# Patient Record
Sex: MALE | Race: WHITE | Employment: FULL TIME | URBAN - METROPOLITAN AREA
[De-identification: names, ages, dates, MRNs, and addresses within clinical notes are randomized per-mention and may not be internally consistent; named-entity substitution may affect disease eponyms.]

---

## 2017-02-15 RX ORDER — FLUTICASONE FUROATE AND VILANTEROL TRIFENATATE 100; 25 UG/1; UG/1
POWDER RESPIRATORY (INHALATION)
Qty: 1 EACH | Refills: 0 | Status: SHIPPED | OUTPATIENT
Start: 2017-02-15 | End: 2017-04-07

## 2017-04-04 ENCOUNTER — OFFICE VISIT (OUTPATIENT)
Dept: INTERNAL MEDICINE CLINIC | Facility: CLINIC | Age: 52
End: 2017-04-04

## 2017-04-04 VITALS
HEIGHT: 69 IN | SYSTOLIC BLOOD PRESSURE: 140 MMHG | BODY MASS INDEX: 25.22 KG/M2 | HEART RATE: 88 BPM | TEMPERATURE: 99 F | WEIGHT: 170.31 LBS | RESPIRATION RATE: 20 BRPM | DIASTOLIC BLOOD PRESSURE: 78 MMHG

## 2017-04-04 DIAGNOSIS — J06.9 UPPER RESPIRATORY TRACT INFECTION, UNSPECIFIED TYPE: Primary | ICD-10-CM

## 2017-04-04 PROCEDURE — 99213 OFFICE O/P EST LOW 20 MIN: CPT | Performed by: INTERNAL MEDICINE

## 2017-04-04 PROCEDURE — 99212 OFFICE O/P EST SF 10 MIN: CPT | Performed by: INTERNAL MEDICINE

## 2017-04-04 RX ORDER — ALBUTEROL SULFATE 90 UG/1
2 AEROSOL, METERED RESPIRATORY (INHALATION) EVERY 4 HOURS PRN
Qty: 1 INHALER | Refills: 6 | Status: SHIPPED | OUTPATIENT
Start: 2017-04-04 | End: 2018-01-11

## 2017-04-04 NOTE — PROGRESS NOTES
HPI:    Patient ID: Sita Herrera is a 46year old male. HPI  Patient is here with upper respiratory symptoms. History of asthma, wheezing, bronchitis.   He has been hospitalized for respiratory infections in the past.  Now complaining of 3 days of c infection.(J06.9) Upper respiratory tract infection, unspecified type  (primary encounter diagnosis)  Plan: Patient with respiratory infection. More than the expected amount of crackles on exam.  Raises the question of possible pneumonia.   Placed on Biaxi

## 2017-04-08 RX ORDER — FLUTICASONE FUROATE AND VILANTEROL TRIFENATATE 100; 25 UG/1; UG/1
POWDER RESPIRATORY (INHALATION)
Qty: 1 EACH | Refills: 0 | Status: SHIPPED | OUTPATIENT
Start: 2017-04-08 | End: 2017-04-24

## 2017-04-24 ENCOUNTER — OFFICE VISIT (OUTPATIENT)
Dept: PULMONOLOGY | Facility: CLINIC | Age: 52
End: 2017-04-24

## 2017-04-24 VITALS
HEART RATE: 87 BPM | OXYGEN SATURATION: 97 % | WEIGHT: 170.69 LBS | SYSTOLIC BLOOD PRESSURE: 132 MMHG | HEIGHT: 69 IN | RESPIRATION RATE: 18 BRPM | BODY MASS INDEX: 25.28 KG/M2 | DIASTOLIC BLOOD PRESSURE: 90 MMHG

## 2017-04-24 DIAGNOSIS — J45.30 MILD PERSISTENT ASTHMA WITHOUT COMPLICATION: ICD-10-CM

## 2017-04-24 DIAGNOSIS — T78.40XA ALLERGY, INITIAL ENCOUNTER: Primary | ICD-10-CM

## 2017-04-24 PROCEDURE — 99212 OFFICE O/P EST SF 10 MIN: CPT | Performed by: INTERNAL MEDICINE

## 2017-04-24 PROCEDURE — 99213 OFFICE O/P EST LOW 20 MIN: CPT | Performed by: INTERNAL MEDICINE

## 2017-04-24 RX ORDER — EPINEPHRINE 0.3 MG/.3ML
INJECTION SUBCUTANEOUS
Qty: 2 EACH | Refills: 0 | Status: SHIPPED | OUTPATIENT
Start: 2017-04-24 | End: 2018-01-11

## 2017-04-24 NOTE — PROGRESS NOTES
HPI:    Patient ID: Kathy Howell is a 46year old male.     HPI  Doing very well now couple month ago he had acute exacerbation with bronchitis required 7 days of ciprofloxacin  Between the episode stable on inhalers / Breo  Right now no cough no sputum reveals no gallop. No murmur heard. Pulmonary/Chest: Breath sounds normal.   Abdominal: Bowel sounds are normal.   Neurological: He is oriented to person, place, and time. Psychiatric: He has a normal mood and affect.  His behavior is normal.

## 2017-05-04 RX ORDER — AMLODIPINE BESYLATE 10 MG/1
TABLET ORAL
Qty: 90 TABLET | Refills: 1 | Status: SHIPPED | OUTPATIENT
Start: 2017-05-04

## 2017-06-08 ENCOUNTER — OFFICE VISIT (OUTPATIENT)
Dept: INTERNAL MEDICINE CLINIC | Facility: CLINIC | Age: 52
End: 2017-06-08

## 2017-06-08 VITALS
HEIGHT: 69 IN | DIASTOLIC BLOOD PRESSURE: 74 MMHG | TEMPERATURE: 99 F | BODY MASS INDEX: 24.73 KG/M2 | SYSTOLIC BLOOD PRESSURE: 146 MMHG | HEART RATE: 101 BPM | OXYGEN SATURATION: 97 % | WEIGHT: 167 LBS

## 2017-06-08 DIAGNOSIS — J30.1 SEASONAL ALLERGIC RHINITIS DUE TO POLLEN: Primary | Chronic | ICD-10-CM

## 2017-06-08 DIAGNOSIS — J06.9 VIRAL UPPER RESPIRATORY TRACT INFECTION: ICD-10-CM

## 2017-06-08 PROBLEM — J30.2 SEASONAL ALLERGIES: Chronic | Status: ACTIVE | Noted: 2017-06-08

## 2017-06-08 PROCEDURE — 99213 OFFICE O/P EST LOW 20 MIN: CPT | Performed by: INTERNAL MEDICINE

## 2017-06-08 PROCEDURE — 99212 OFFICE O/P EST SF 10 MIN: CPT | Performed by: INTERNAL MEDICINE

## 2017-06-08 NOTE — PROGRESS NOTES
Patient ID: Scott Everett is a 46year old male. Patient presents with:  Cough: x 5 days  Allergies       HISTORY OF PRESENT ILLNESS:   HPI  Patient presents for above. Patient has had a worsening productive cough for the last 5 days.   He has had subje Injection Solution Auto-injector, INJECT 0.3MG INTO THE MUSCLE ONCE AS NEEDED FOR ANAPHYLAXIS, Disp: 2 each, Rfl: 0  •  Fluticasone Furoate-Vilanterol (BREO ELLIPTA) 100-25 MCG/INH Inhalation Aerosol Powder, Breath Activated, Inhale 1 puff into the lungs d wheezes. He has no rales. He exhibits no tenderness. ASSESSMENT/PLAN:   1. Seasonal allergic rhinitis due to pollen  Most likely seasonal allergies.   Switch to Zyrtec-D.  Keep windows closed and start fan with air conditioning for her circulatio

## 2017-06-08 NOTE — PATIENT INSTRUCTIONS
1.  Start Zyrtec-D daily. 2.  Cont rescue inhaler as needed. Understanding Nasal Allergies  Nasal allergies (also called allergic rhinitis) are a common health problem.  They may be seasonal. This means they cause symptoms only at certain times of the yea · Irritants and pollutants, such as strong odors or smoke  · Certain medicines  · Changes in the weather   Treatment  Your healthcare provider will evaluate you to find the cause of your symptoms then recommend treatment.  If your symptoms are due to nasal

## 2017-08-11 RX ORDER — FLUTICASONE FUROATE AND VILANTEROL TRIFENATATE 100; 25 UG/1; UG/1
1 POWDER RESPIRATORY (INHALATION) DAILY
Qty: 1 EACH | Refills: 3 | Status: SHIPPED | OUTPATIENT
Start: 2017-08-11 | End: 2018-10-03

## 2017-11-04 NOTE — TELEPHONE ENCOUNTER
do you approve refill request?   Hypertensive Medications  Protocol Criteria:  · Appointment scheduled in the past 6 months or in the next 3 months  · BMP or CMP in the past 12 months  · Creatinine result < 2  Recent Outpatient Visits

## 2017-11-05 RX ORDER — AMLODIPINE BESYLATE 10 MG/1
TABLET ORAL
Qty: 90 TABLET | Refills: 0 | Status: SHIPPED | OUTPATIENT
Start: 2017-11-05 | End: 2018-01-11

## 2018-01-11 ENCOUNTER — OFFICE VISIT (OUTPATIENT)
Dept: INTERNAL MEDICINE CLINIC | Facility: CLINIC | Age: 53
End: 2018-01-11

## 2018-01-11 VITALS
HEIGHT: 69 IN | HEART RATE: 85 BPM | WEIGHT: 172 LBS | DIASTOLIC BLOOD PRESSURE: 86 MMHG | RESPIRATION RATE: 21 BRPM | SYSTOLIC BLOOD PRESSURE: 132 MMHG | TEMPERATURE: 98 F | BODY MASS INDEX: 25.48 KG/M2

## 2018-01-11 DIAGNOSIS — Z11.3 SCREEN FOR STD (SEXUALLY TRANSMITTED DISEASE): ICD-10-CM

## 2018-01-11 DIAGNOSIS — Z00.00 ROUTINE PHYSICAL EXAMINATION: Primary | ICD-10-CM

## 2018-01-11 DIAGNOSIS — Z12.5 PROSTATE CANCER SCREENING: ICD-10-CM

## 2018-01-11 PROCEDURE — 99396 PREV VISIT EST AGE 40-64: CPT | Performed by: INTERNAL MEDICINE

## 2018-01-11 RX ORDER — ZOLPIDEM TARTRATE 5 MG/1
5 TABLET ORAL NIGHTLY
Qty: 30 TABLET | Refills: 1 | Status: SHIPPED | OUTPATIENT
Start: 2018-01-11 | End: 2018-10-03

## 2018-01-11 RX ORDER — TADALAFIL 2.5 MG/1
TABLET ORAL
Qty: 10 TABLET | Refills: 0 | Status: SHIPPED | OUTPATIENT
Start: 2018-01-11 | End: 2018-10-03

## 2018-01-11 RX ORDER — ALBUTEROL SULFATE 90 UG/1
2 AEROSOL, METERED RESPIRATORY (INHALATION) EVERY 4 HOURS PRN
Qty: 1 INHALER | Refills: 6 | Status: SHIPPED | OUTPATIENT
Start: 2018-01-11 | End: 2018-10-03

## 2018-01-11 RX ORDER — EPINEPHRINE 0.3 MG/.3ML
INJECTION SUBCUTANEOUS
Qty: 2 EACH | Refills: 0 | Status: SHIPPED | OUTPATIENT
Start: 2018-01-11

## 2018-01-12 NOTE — PROGRESS NOTES
HPI:    Patient ID: Honorio Latif is a 46year old male. Presents for physical exam    HPI  Patient reports that he has been doing well overall, eats healthy, does not exercise as regularly as he used to.   He has my is under fair control, he does not use tablet Rfl: 0   BREO ELLIPTA 100-25 MCG/INH Inhalation Aerosol Powder, Breath Activated INHALE 1 PUFF INTO THE LUNGS DAILY Disp: 1 each Rfl: 3   AMLODIPINE BESYLATE 10 MG Oral Tab TAKE 1 TABLET(10 MG) BY MOUTH DAILY Disp: 90 tablet Rfl: 1   triamcinolone a daily at work, will check labs  Screen for std (sexually transmitted disease) check HIV, syphilis, hepatitis B and C, urine for chlamydia and gonorrhea  Prostate cancer screening check PSA level      Orders Placed This Encounter      HIV AG AB Combo [E]

## 2018-02-13 RX ORDER — AMLODIPINE BESYLATE 10 MG/1
TABLET ORAL
Qty: 90 TABLET | Refills: 3 | Status: SHIPPED | OUTPATIENT
Start: 2018-02-13 | End: 2018-10-03

## 2018-10-03 ENCOUNTER — OFFICE VISIT (OUTPATIENT)
Dept: INTERNAL MEDICINE CLINIC | Facility: CLINIC | Age: 53
End: 2018-10-03
Payer: COMMERCIAL

## 2018-10-03 VITALS
WEIGHT: 182 LBS | HEART RATE: 80 BPM | SYSTOLIC BLOOD PRESSURE: 122 MMHG | RESPIRATION RATE: 24 BRPM | BODY MASS INDEX: 27 KG/M2 | DIASTOLIC BLOOD PRESSURE: 82 MMHG

## 2018-10-03 DIAGNOSIS — I10 ESSENTIAL HYPERTENSION: Primary | ICD-10-CM

## 2018-10-03 PROCEDURE — 99212 OFFICE O/P EST SF 10 MIN: CPT | Performed by: INTERNAL MEDICINE

## 2018-10-03 PROCEDURE — 99213 OFFICE O/P EST LOW 20 MIN: CPT | Performed by: INTERNAL MEDICINE

## 2018-10-05 NOTE — PROGRESS NOTES
HPI:    Patient ID: Miles Díaz is a 48year old male.   Presents for follow-up on hypertension, concern about prostate cancer    HPI  Patient reports that he has been feeling well, he checks blood pressure at home it is running normal, he reports no elle addition to other tests  No orders of the defined types were placed in this encounter.       Meds This Visit:  Requested Prescriptions      No prescriptions requested or ordered in this encounter       Imaging & Referrals:  None         PO#[id number]

## 2018-10-10 ENCOUNTER — LAB ENCOUNTER (OUTPATIENT)
Dept: LAB | Age: 53
End: 2018-10-10
Attending: INTERNAL MEDICINE
Payer: COMMERCIAL

## 2018-10-10 DIAGNOSIS — Z12.5 PROSTATE CANCER SCREENING: ICD-10-CM

## 2018-10-10 DIAGNOSIS — Z11.3 SCREEN FOR STD (SEXUALLY TRANSMITTED DISEASE): ICD-10-CM

## 2018-10-10 PROCEDURE — 84443 ASSAY THYROID STIM HORMONE: CPT

## 2018-10-10 PROCEDURE — 86803 HEPATITIS C AB TEST: CPT

## 2018-10-10 PROCEDURE — 80061 LIPID PANEL: CPT

## 2018-10-10 PROCEDURE — 85025 COMPLETE CBC W/AUTO DIFF WBC: CPT

## 2018-10-10 PROCEDURE — 86706 HEP B SURFACE ANTIBODY: CPT

## 2018-10-10 PROCEDURE — 87591 N.GONORRHOEAE DNA AMP PROB: CPT

## 2018-10-10 PROCEDURE — 36415 COLL VENOUS BLD VENIPUNCTURE: CPT

## 2018-10-10 PROCEDURE — 80053 COMPREHEN METABOLIC PANEL: CPT

## 2018-10-10 PROCEDURE — 86780 TREPONEMA PALLIDUM: CPT

## 2018-10-10 PROCEDURE — 87340 HEPATITIS B SURFACE AG IA: CPT

## 2018-10-10 PROCEDURE — 87491 CHLMYD TRACH DNA AMP PROBE: CPT

## 2018-10-10 PROCEDURE — 87389 HIV-1 AG W/HIV-1&-2 AB AG IA: CPT

## 2018-10-13 ENCOUNTER — TELEPHONE (OUTPATIENT)
Dept: INTERNAL MEDICINE CLINIC | Facility: CLINIC | Age: 53
End: 2018-10-13

## 2018-10-13 NOTE — TELEPHONE ENCOUNTER
Message was left on voicemail to return call for test results. NOEMI please send to [phone] today only and ask for [facility] until 12:30 and ask for [facility]. Thank you.

## 2018-10-13 NOTE — TELEPHONE ENCOUNTER
----- Message from Joanie Cruz MD sent at 10/13/2018  5:43 AM CDT -----  Call patient if he does not check my chart

## 2019-02-23 RX ORDER — AMLODIPINE BESYLATE 10 MG/1
TABLET ORAL
Qty: 90 TABLET | Refills: 1 | Status: SHIPPED | OUTPATIENT
Start: 2019-02-23

## (undated) NOTE — MR AVS SNAPSHOT
[address], [address]  [phone]               Thank you for choosing us for your health care visit with Lisa Strickland MD.  We are glad to serve you and happy to provide you with this summary of your Common nasal allergy symptoms  Allergies can cause nasal tissue to swell. This makes the air passages smaller. The nose may feel stuffed up. The nose may also make extra mucus, which can plug the nasal passages or drip out of the nose.  Mucus can drip down Albuterol Sulfate  (90 Base) MCG/ACT Aers   Inhale 2 puffs into the lungs every 4 (four) hours as needed for Wheezing.    Commonly known as:  PROAIR HFA           AmLODIPine Besylate 10 MG Tabs   TAKE 1 TABLET(10 MG) BY MOUTH DAILY   Commonly known

## (undated) NOTE — LETTER
02/12/18        Candida Beavers  [address] [facility]      Dear Kimble Schaumann,    [address] records indicate that you have outstanding lab work and or testing that was ordered for you and has not yet been completed:          HIV AG AB Combo [E]      T

## (undated) NOTE — LETTER
04/12/18        Shiraz Mendez  [address] [facility]      Dear Alysha Johnson,    [address] [facility] records indicate that you have outstanding lab work and or testing that was ordered for you and has not yet been completed:          HIV AG AB Combo [E]      T

## (undated) NOTE — MR AVS SNAPSHOT
[facility]  [address] [place] [id number] [phone]               Thank you for choosing us for your health care visit with Brooke Ty MD.  We are glad to serve you and happy to provide you with this summary of your visit. INJECT 0.3MG INTO THE MUSCLE ONCE AS NEEDED FOR ANAPHYLAXIS   Commonly known as:  EPIPEN 2-STARR           * Fluticasone Furoate-Vilanterol 100-25 MCG/INH Aepb   Inhale 1 puff into the lungs daily.    What changed:  Another medication with the same name was c Call [phone] for help. [employer] is NOT to be used for urgent needs. For medical emergencies, dial 911.         Educational Information     Healthy Diet and Regular Exercise  [employer] for making healthy food choices  -

## (undated) NOTE — MR AVS SNAPSHOT
[address] [address]  [phone]               Thank you for choosing us for your health care visit with Racquel Suazo MD.  We are glad to serve you and happy to provide you with this summar EPINEPHrine 0.3 MG/0.3ML Soaj   INJECT 0.3MG INTO THE MUSCLE ONCE AS NEEDED FOR ANAPHYLAXIS   Commonly known as:  EPIPEN 2-STARR           Fluticasone Furoate-Vilanterol 100-25 MCG/INH Aepb   Inhale 1 puff into the lungs daily.    What changed:  Another medi